# Patient Record
Sex: MALE | Race: WHITE | NOT HISPANIC OR LATINO | Employment: FULL TIME | URBAN - METROPOLITAN AREA
[De-identification: names, ages, dates, MRNs, and addresses within clinical notes are randomized per-mention and may not be internally consistent; named-entity substitution may affect disease eponyms.]

---

## 2017-03-20 ENCOUNTER — OFFICE VISIT (OUTPATIENT)
Dept: FAMILY MEDICINE CLINIC | Facility: CLINIC | Age: 37
End: 2017-03-20

## 2017-03-20 VITALS
TEMPERATURE: 98.3 F | HEART RATE: 66 BPM | OXYGEN SATURATION: 98 % | BODY MASS INDEX: 31.15 KG/M2 | SYSTOLIC BLOOD PRESSURE: 110 MMHG | HEIGHT: 72 IN | WEIGHT: 230 LBS | DIASTOLIC BLOOD PRESSURE: 60 MMHG

## 2017-03-20 DIAGNOSIS — H10.021 PINK EYE DISEASE OF RIGHT EYE: Primary | ICD-10-CM

## 2017-03-20 PROBLEM — R03.0 BLOOD PRESSURE ELEVATED WITHOUT HISTORY OF HTN: Status: ACTIVE | Noted: 2017-03-20

## 2017-03-20 PROBLEM — H10.022 PINK EYE DISEASE OF LEFT EYE: Status: ACTIVE | Noted: 2017-03-20

## 2017-03-20 PROBLEM — E78.5 HLD (HYPERLIPIDEMIA): Status: ACTIVE | Noted: 2017-03-20

## 2017-03-20 PROBLEM — R55 NEUROCARDIOGENIC SYNCOPE: Status: ACTIVE | Noted: 2017-03-20

## 2017-03-20 PROBLEM — J03.90 ACUTE INFECTIVE TONSILLITIS: Status: ACTIVE | Noted: 2017-03-20

## 2017-03-20 PROBLEM — E66.3 EXCESS WEIGHT: Status: ACTIVE | Noted: 2017-03-20

## 2017-03-20 PROCEDURE — 99213 OFFICE O/P EST LOW 20 MIN: CPT | Performed by: FAMILY MEDICINE

## 2017-03-20 RX ORDER — POLYMYXIN B SULFATE AND TRIMETHOPRIM 1; 10000 MG/ML; [USP'U]/ML
1 SOLUTION OPHTHALMIC EVERY 4 HOURS
Qty: 1 EACH | Refills: 0 | Status: SHIPPED | OUTPATIENT
Start: 2017-03-20 | End: 2019-12-02

## 2017-03-20 NOTE — PROGRESS NOTES
Subjective   Javid Cancino is a 36 y.o. male who is here for   Chief Complaint   Patient presents with   • Conjunctivitis   .     History of Present Illness     2 days of right eye redness and tearing  No direct exposure    The following portions of the patient's history were reviewed and updated as appropriate: allergies, current medications, past medical history, past social history and problem list.    Review of Systems   Eyes: Positive for photophobia, redness, itching and visual disturbance. Negative for pain and discharge.       Objective   Physical Exam   Constitutional: He appears well-developed and well-nourished. No distress.   Eyes: EOM are normal. Pupils are equal, round, and reactive to light. Right eye exhibits chemosis. Right eye exhibits no hordeolum. Right conjunctiva is injected. Left conjunctiva is not injected.   Nursing note and vitals reviewed.      Assessment/Plan   Javid was seen today for conjunctivitis.    Diagnoses and all orders for this visit:    Pink eye disease of right eye  -     trimethoprim-polymyxin b (POLYTRIM) 08528-2.1 UNIT/ML-% ophthalmic solution; Administer 1 drop to the right eye Every 4 (Four) Hours.      There are no Patient Instructions on file for this visit.    There are no discontinued medications.     Return if symptoms worsen or fail to improve.    Dr. Rafa Núñez  [place], [place]

## 2019-11-22 DIAGNOSIS — Z00.00 PHYSICAL EXAM: Primary | ICD-10-CM

## 2019-11-26 LAB
ALBUMIN SERPL-MCNC: 4.5 G/DL (ref 3.5–5.2)
ALBUMIN/GLOB SERPL: 1.8 G/DL
ALP SERPL-CCNC: 73 U/L (ref 39–117)
ALT SERPL-CCNC: 26 U/L (ref 1–41)
APPEARANCE UR: CLEAR
AST SERPL-CCNC: 24 U/L (ref 1–40)
BILIRUB SERPL-MCNC: 0.3 MG/DL (ref 0.2–1.2)
BILIRUB UR QL STRIP: NEGATIVE
BUN SERPL-MCNC: 19 MG/DL (ref 6–20)
BUN/CREAT SERPL: 17 (ref 7–25)
CALCIUM SERPL-MCNC: 9.9 MG/DL (ref 8.6–10.5)
CHLORIDE SERPL-SCNC: 103 MMOL/L (ref 98–107)
CHOLEST SERPL-MCNC: 213 MG/DL (ref 0–200)
CO2 SERPL-SCNC: 25.1 MMOL/L (ref 22–29)
COLOR UR: YELLOW
CREAT SERPL-MCNC: 1.12 MG/DL (ref 0.76–1.27)
ERYTHROCYTE [DISTWIDTH] IN BLOOD BY AUTOMATED COUNT: 12.7 % (ref 12.3–15.4)
GLOBULIN SER CALC-MCNC: 2.5 GM/DL
GLUCOSE SERPL-MCNC: 101 MG/DL (ref 65–99)
GLUCOSE UR QL: NEGATIVE
HCT VFR BLD AUTO: 43.7 % (ref 37.5–51)
HDLC SERPL-MCNC: 56 MG/DL (ref 40–60)
HGB BLD-MCNC: 15.2 G/DL (ref 13–17.7)
HGB UR QL STRIP: NEGATIVE
KETONES UR QL STRIP: NEGATIVE
LDLC SERPL CALC-MCNC: 137 MG/DL (ref 0–100)
LEUKOCYTE ESTERASE UR QL STRIP: NEGATIVE
MCH RBC QN AUTO: 29.7 PG (ref 26.6–33)
MCHC RBC AUTO-ENTMCNC: 34.8 G/DL (ref 31.5–35.7)
MCV RBC AUTO: 85.4 FL (ref 79–97)
NITRITE UR QL STRIP: NEGATIVE
PH UR STRIP: 6 [PH] (ref 5–8)
PLATELET # BLD AUTO: 227 10*3/MM3 (ref 140–450)
POTASSIUM SERPL-SCNC: 4.8 MMOL/L (ref 3.5–5.2)
PROT SERPL-MCNC: 7 G/DL (ref 6–8.5)
PROT UR QL STRIP: NEGATIVE
RBC # BLD AUTO: 5.12 10*6/MM3 (ref 4.14–5.8)
SODIUM SERPL-SCNC: 143 MMOL/L (ref 136–145)
SP GR UR: 1.01 (ref 1–1.03)
TRIGL SERPL-MCNC: 98 MG/DL (ref 0–150)
UROBILINOGEN UR STRIP-MCNC: NORMAL MG/DL
VLDLC SERPL CALC-MCNC: 19.6 MG/DL
WBC # BLD AUTO: 5.18 10*3/MM3 (ref 3.4–10.8)

## 2019-12-02 ENCOUNTER — OFFICE VISIT (OUTPATIENT)
Dept: FAMILY MEDICINE CLINIC | Facility: CLINIC | Age: 39
End: 2019-12-02

## 2019-12-02 VITALS
WEIGHT: 246 LBS | HEART RATE: 54 BPM | HEIGHT: 72 IN | OXYGEN SATURATION: 99 % | BODY MASS INDEX: 33.32 KG/M2 | DIASTOLIC BLOOD PRESSURE: 84 MMHG | SYSTOLIC BLOOD PRESSURE: 122 MMHG

## 2019-12-02 DIAGNOSIS — Z00.00 ROUTINE ADULT HEALTH MAINTENANCE: Primary | ICD-10-CM

## 2019-12-02 DIAGNOSIS — E78.2 MIXED HYPERLIPIDEMIA: ICD-10-CM

## 2019-12-02 PROBLEM — R03.0 BLOOD PRESSURE ELEVATED WITHOUT HISTORY OF HTN: Status: RESOLVED | Noted: 2017-03-20 | Resolved: 2019-12-02

## 2019-12-02 PROBLEM — H10.021 PINK EYE DISEASE OF RIGHT EYE: Status: RESOLVED | Noted: 2017-03-20 | Resolved: 2019-12-02

## 2019-12-02 PROBLEM — E66.09 CLASS 1 OBESITY DUE TO EXCESS CALORIES WITHOUT SERIOUS COMORBIDITY WITH BODY MASS INDEX (BMI) OF 33.0 TO 33.9 IN ADULT: Status: ACTIVE | Noted: 2017-03-20

## 2019-12-02 PROBLEM — J03.90 ACUTE INFECTIVE TONSILLITIS: Status: RESOLVED | Noted: 2017-03-20 | Resolved: 2019-12-02

## 2019-12-02 PROBLEM — E66.811 CLASS 1 OBESITY DUE TO EXCESS CALORIES WITHOUT SERIOUS COMORBIDITY WITH BODY MASS INDEX (BMI) OF 33.0 TO 33.9 IN ADULT: Status: ACTIVE | Noted: 2017-03-20

## 2019-12-02 PROCEDURE — 99395 PREV VISIT EST AGE 18-39: CPT | Performed by: FAMILY MEDICINE

## 2019-12-02 NOTE — PROGRESS NOTES
"   Chief Complaint   Patient presents with   • Annual Exam       Subjective   Javid Cancino is a 39 y.o. male and is here for a yearly physical exam. The patient reports no problems.    Do you take any herbs or supplements that were not prescribed by a doctor? no. If so, these will be added to active medication list.    The following portions of the patient's history were reviewed and updated as appropriate: allergies, current medications, past family history, past medical history, past social history, past surgical history and problem list.    Social and Family and Surgical History reviewed and updated today, see Rooming tab.    Health History, Preventive Measures and Vaccination flow sheets reviewed and updated today.    Patient's current medical chart in Epic; including previous office notes, imaging, labs, specialist's evaluation either in notes or in Media tab reviewed today.    Other pertinent medical information also reviewed thru Care Everywhere function is also reviewed today.    Review of Systems  Review of Systems  A comprehensive review of systems was negative.    Vitals:    12/02/19 0904   BP: 122/84   BP Location: Left arm   Patient Position: Sitting   Cuff Size: Large Adult   Pulse: 54   SpO2: 99%   Weight: 112 kg (246 lb)   Height: 182.9 cm (72\")       General Appearance:  Alert, cooperative, no distress, appears stated age   Head:  Normocephalic, without obvious abnormality, atraumatic   Eyes:  PERRL, conjunctiva/corneas clear, EOM's intact.   Ears:  Normal TM's and external ear canals, both ears   Nose: Nares normal, septum midline, mucosa normal, no drainage or sinus tenderness   Throat: Lips, mucosa, and tongue normal; teeth and gums normal   Neck: Supple, symmetrical, trachea midline, no adenopathy;   thyroid: No enlargement/tenderness/nodules; no carotid  bruit   Back:  Symmetric, no curvature, ROM normal, no CVA tenderness   Lungs:  Clear to auscultation bilaterally, respirations unlabored "   Chest wall:  No tenderness or deformity   Heart:  Regular rate and rhythm, S1 and S2 normal, no murmur, rub or gallop   Abdomen:  Soft, non-tender, bowel sounds active all four quadrants,   no masses, no organomegaly   Rectal:        Extremities: Extremities normal, atraumatic, no cyanosis or edema   Pulses: 2+ and symmetric all extremities   Skin: Skin color, texture, turgor normal, no rashes or lesions   Lymph nodes: Cervical, supraclavicular, and axillary nodes normal   Neurologic: CNII-XII intact. Normal strength, sensation and reflexes   throughout          Results for orders placed or performed in visit on 11/22/19   Lipid Panel   Result Value Ref Range    Total Cholesterol 213 (H) 0 - 200 mg/dL    Triglycerides 98 0 - 150 mg/dL    HDL Cholesterol 56 40 - 60 mg/dL    VLDL Cholesterol 19.6 mg/dL    LDL Cholesterol  137 (H) 0 - 100 mg/dL   Comprehensive Metabolic Panel   Result Value Ref Range    Glucose 101 (H) 65 - 99 mg/dL    BUN 19 6 - 20 mg/dL    Creatinine 1.12 0.76 - 1.27 mg/dL    eGFR Non African Am 74 >60 mL/min/1.73    eGFR African Am 89 >60 mL/min/1.73    BUN/Creatinine Ratio 17.0 7.0 - 25.0    Sodium 143 136 - 145 mmol/L    Potassium 4.8 3.5 - 5.2 mmol/L    Chloride 103 98 - 107 mmol/L    Total CO2 25.1 22.0 - 29.0 mmol/L    Calcium 9.9 8.6 - 10.5 mg/dL    Total Protein 7.0 6.0 - 8.5 g/dL    Albumin 4.50 3.50 - 5.20 g/dL    Globulin 2.5 gm/dL    A/G Ratio 1.8 g/dL    Total Bilirubin 0.3 0.2 - 1.2 mg/dL    Alkaline Phosphatase 73 39 - 117 U/L    AST (SGOT) 24 1 - 40 U/L    ALT (SGPT) 26 1 - 41 U/L   CBC (No Diff)   Result Value Ref Range    WBC 5.18 3.40 - 10.80 10*3/mm3    RBC 5.12 4.14 - 5.80 10*6/mm3    Hemoglobin 15.2 13.0 - 17.7 g/dL    Hematocrit 43.7 37.5 - 51.0 %    MCV 85.4 79.0 - 97.0 fL    MCH 29.7 26.6 - 33.0 pg    MCHC 34.8 31.5 - 35.7 g/dL    RDW 12.7 12.3 - 15.4 %    Platelets 227 140 - 450 10*3/mm3   Urinalysis With Microscopic If Indicated (No Culture) - Urine, Clean Catch   Result  Value Ref Range    Specific Gravity, UA 1.013 1.005 - 1.030    pH, UA 6.0 5.0 - 8.0    Color, UA Yellow     Appearance, UA Clear Clear    Leukocytes, UA Negative Negative    Protein Negative Negative    Glucose, UA Negative Negative    Ketones Negative Negative    Blood, UA Negative Negative    Bilirubin, UA Negative Negative    Urobilinogen, UA Comment     Nitrite, UA Negative Negative     Assessment/Plan   Healthy male exam.  Javid was seen today for annual exam.    Diagnoses and all orders for this visit:    Routine adult health maintenance  -     Lipid Panel; Future  -     CBC (No Diff); Future  -     Comprehensive Metabolic Panel; Future  -     Urinalysis With Microscopic If Indicated (No Culture) - Urine, Clean Catch; Future    Mixed hyperlipidemia  -     Lipid Panel; Future      1. Doing well  Chol up some  2. Patient Counseling:  --Nutrition: Stressed importance of moderation in sodium/caffeine intake, saturated fat and cholesterol.  Discussed caloric balance, sufficient intake of fresh fruits, vegetables, fiber, calcium, iron.  --  --Exercise: Stressed the importance of regular exercise. Keep playing basketball and lifting weights    --Substance Abuse: Discussed cessation/primary prevention of tobacco, alcohol, or other drug use; driving or other dangerous activities under the influence.    --Dental health: Discussed importance of regular tooth brushing, flossing, and dental visits.  -- suggested having eyes and vision checked if needed or past due.  --Immunizations reviewed.  -  3. Discussed the patient's BMI with him.  The BMI is above average; BMI management plan is completed  4. Follow up in one year    There are no Patient Instructions on file for this visit.    Medications Discontinued During This Encounter   Medication Reason   • trimethoprim-polymyxin b (POLYTRIM) 36722-8.1 UNIT/ML-% ophthalmic solution *Therapy completed        Dr. Rafa Núñez MD  [place], [place]  [facility]

## 2020-12-02 ENCOUNTER — RESULTS ENCOUNTER (OUTPATIENT)
Dept: FAMILY MEDICINE CLINIC | Facility: CLINIC | Age: 40
End: 2020-12-02

## 2020-12-02 DIAGNOSIS — Z00.00 ROUTINE ADULT HEALTH MAINTENANCE: ICD-10-CM

## 2020-12-02 DIAGNOSIS — E78.2 MIXED HYPERLIPIDEMIA: ICD-10-CM

## 2022-06-29 ENCOUNTER — TELEPHONE (OUTPATIENT)
Dept: FAMILY MEDICINE CLINIC | Facility: CLINIC | Age: 42
End: 2022-06-29

## 2022-06-29 NOTE — TELEPHONE ENCOUNTER
Caller: Javid Cancino    Relationship: Self    Best call back number: [phone]    What is the medical concern/diagnosis: EYE MISALIGNMENT    What specialty or service is being requested: OPHTHALMOLOGIST OR OPTOMETRIST     What is the provider, practice or medical service name: WHOEVER HIS PCP RECOMMENDS  nical staff to follow up on this request.”

## 2022-06-29 NOTE — TELEPHONE ENCOUNTER
Dr. David Jackson, optometrist.  [facility] eye [facility].  office is within the [facility]

## 2022-09-12 DIAGNOSIS — Z00.00 ROUTINE ADULT HEALTH MAINTENANCE: Primary | ICD-10-CM

## 2022-09-12 DIAGNOSIS — Z00.00 ROUTINE ADULT HEALTH MAINTENANCE: ICD-10-CM

## 2022-09-12 DIAGNOSIS — E78.2 MIXED HYPERLIPIDEMIA: ICD-10-CM

## 2022-09-14 LAB
ALBUMIN SERPL-MCNC: 4.9 G/DL (ref 3.5–5.2)
ALBUMIN/GLOB SERPL: 2.3 G/DL
ALP SERPL-CCNC: 83 U/L (ref 39–117)
ALT SERPL-CCNC: 26 U/L (ref 1–41)
APPEARANCE UR: CLEAR
AST SERPL-CCNC: 25 U/L (ref 1–40)
BILIRUB SERPL-MCNC: 0.3 MG/DL (ref 0–1.2)
BILIRUB UR QL STRIP: NEGATIVE
BUN SERPL-MCNC: 18 MG/DL (ref 6–20)
BUN/CREAT SERPL: 14.2 (ref 7–25)
CALCIUM SERPL-MCNC: 9.8 MG/DL (ref 8.6–10.5)
CHLORIDE SERPL-SCNC: 103 MMOL/L (ref 98–107)
CHOLEST SERPL-MCNC: 234 MG/DL (ref 0–200)
CO2 SERPL-SCNC: 27.8 MMOL/L (ref 22–29)
COLOR UR: YELLOW
CREAT SERPL-MCNC: 1.27 MG/DL (ref 0.76–1.27)
EGFRCR-CYS SERPLBLD CKD-EPI 2021: 73.2 ML/MIN/1.73
ERYTHROCYTE [DISTWIDTH] IN BLOOD BY AUTOMATED COUNT: 12.5 % (ref 12.3–15.4)
GLOBULIN SER CALC-MCNC: 2.1 GM/DL
GLUCOSE SERPL-MCNC: 101 MG/DL (ref 65–99)
GLUCOSE UR QL STRIP: NEGATIVE
HCT VFR BLD AUTO: 46.5 % (ref 37.5–51)
HDLC SERPL-MCNC: 51 MG/DL (ref 40–60)
HGB BLD-MCNC: 15.2 G/DL (ref 13–17.7)
HGB UR QL STRIP: NEGATIVE
KETONES UR QL STRIP: NEGATIVE
LDLC SERPL CALC-MCNC: 149 MG/DL (ref 0–100)
LEUKOCYTE ESTERASE UR QL STRIP: NEGATIVE
MCH RBC QN AUTO: 29.6 PG (ref 26.6–33)
MCHC RBC AUTO-ENTMCNC: 32.7 G/DL (ref 31.5–35.7)
MCV RBC AUTO: 90.5 FL (ref 79–97)
NITRITE UR QL STRIP: NEGATIVE
PH UR STRIP: 6 [PH] (ref 5–8)
PLATELET # BLD AUTO: 271 10*3/MM3 (ref 140–450)
POTASSIUM SERPL-SCNC: 4.6 MMOL/L (ref 3.5–5.2)
PROT SERPL-MCNC: 7 G/DL (ref 6–8.5)
PROT UR QL STRIP: NEGATIVE
RBC # BLD AUTO: 5.14 10*6/MM3 (ref 4.14–5.8)
SODIUM SERPL-SCNC: 141 MMOL/L (ref 136–145)
SP GR UR STRIP: 1.01 (ref 1–1.03)
TRIGL SERPL-MCNC: 190 MG/DL (ref 0–150)
UROBILINOGEN UR STRIP-MCNC: NORMAL MG/DL
VLDLC SERPL CALC-MCNC: 34 MG/DL (ref 5–40)
WBC # BLD AUTO: 5.66 10*3/MM3 (ref 3.4–10.8)

## 2022-09-30 ENCOUNTER — OFFICE VISIT (OUTPATIENT)
Dept: FAMILY MEDICINE CLINIC | Facility: CLINIC | Age: 42
End: 2022-09-30

## 2022-09-30 VITALS
OXYGEN SATURATION: 95 % | WEIGHT: 248 LBS | TEMPERATURE: 97.8 F | HEART RATE: 66 BPM | HEIGHT: 72 IN | DIASTOLIC BLOOD PRESSURE: 90 MMHG | SYSTOLIC BLOOD PRESSURE: 138 MMHG | BODY MASS INDEX: 33.59 KG/M2

## 2022-09-30 DIAGNOSIS — R03.0 ELEVATED BLOOD PRESSURE READING: Primary | ICD-10-CM

## 2022-09-30 PROCEDURE — 99212 OFFICE O/P EST SF 10 MIN: CPT | Performed by: FAMILY MEDICINE

## 2022-11-11 ENCOUNTER — OFFICE VISIT (OUTPATIENT)
Dept: FAMILY MEDICINE CLINIC | Facility: CLINIC | Age: 42
End: 2022-11-11

## 2022-11-11 VITALS
BODY MASS INDEX: 34.27 KG/M2 | HEART RATE: 56 BPM | DIASTOLIC BLOOD PRESSURE: 82 MMHG | WEIGHT: 253 LBS | OXYGEN SATURATION: 98 % | HEIGHT: 72 IN | SYSTOLIC BLOOD PRESSURE: 130 MMHG | TEMPERATURE: 97.3 F

## 2022-11-11 DIAGNOSIS — E78.2 MODERATE MIXED HYPERLIPIDEMIA NOT REQUIRING STATIN THERAPY: ICD-10-CM

## 2022-11-11 DIAGNOSIS — R03.0 ELEVATED BLOOD PRESSURE READING: Primary | ICD-10-CM

## 2022-11-11 PROCEDURE — 99213 OFFICE O/P EST LOW 20 MIN: CPT | Performed by: FAMILY MEDICINE

## 2022-11-11 NOTE — PROGRESS NOTES
"  Subjective   Javid Cancino is a 42 y.o. male who is here for   Chief Complaint   Patient presents with   • Hypertension     6 week f/u - checking bp at home, 133/82- 136/80 averaging    .   Answers for HPI/ROS submitted by the patient on 11/9/2022  What is the primary reason for your visit?: High Blood Pressure      History of Present Illness   Following up with his elevated blood pressure readings at home.  His numbers have improved at home.  He is checking his blood pressure 1-2 times a day averaging 130 systolic.  He is having no cardiovascular symptoms.      The following portions of the patient's history were reviewed and updated as appropriate: allergies, current medications, past family history, past medical history, past social history, past surgical history and problem list.    Review of Systems    Objective   Vitals:    11/11/22 1328   BP: 130/82   BP Location: Left arm   Patient Position: Sitting   Cuff Size: Large Adult   Pulse: 56   Temp: 97.3 °F (36.3 °C)   SpO2: 98%   Weight: 115 kg (253 lb)   Height: 182.9 cm (72\")      Physical Exam  Vitals reviewed.   Cardiovascular:      Rate and Rhythm: Normal rate.   Pulmonary:      Effort: Pulmonary effort is normal.   Neurological:      Mental Status: He is alert.           Home cuff on right:  147/81 HR 59  MD manual : 150/90    Assessment & Plan   Diagnoses and all orders for this visit:    1. Elevated blood pressure reading (Primary)  -     Basic Metabolic Panel; Future  -     Lipid Panel; Future    2. Moderate mixed hyperlipidemia not requiring statin therapy  -     Lipid Panel; Future    Blood pressure is elevated here in the office.  We cross checked his home cuff with an manual and they are reading similar    No need for medication at this time.    Continue healthy lifestyle changes discussed.  We will see him back in 6 months  There are no Patient Instructions on file for this visit.    There are no discontinued medications.     Return in about 6 " months (around 5/11/2023) for blood pressure, Annual physical.    Dr. Rafa Núñez  [place], [place]

## 2023-05-18 ENCOUNTER — OFFICE VISIT (OUTPATIENT)
Dept: FAMILY MEDICINE CLINIC | Facility: CLINIC | Age: 43
End: 2023-05-18
Payer: COMMERCIAL

## 2023-05-18 VITALS
BODY MASS INDEX: 33.18 KG/M2 | HEART RATE: 46 BPM | TEMPERATURE: 97.5 F | HEIGHT: 72 IN | SYSTOLIC BLOOD PRESSURE: 120 MMHG | WEIGHT: 245 LBS | OXYGEN SATURATION: 97 % | DIASTOLIC BLOOD PRESSURE: 74 MMHG

## 2023-05-18 DIAGNOSIS — Z00.00 ROUTINE ADULT HEALTH MAINTENANCE: Primary | ICD-10-CM

## 2023-05-18 DIAGNOSIS — E78.2 MODERATE MIXED HYPERLIPIDEMIA NOT REQUIRING STATIN THERAPY: ICD-10-CM

## 2023-05-18 PROBLEM — R55 NEUROCARDIOGENIC SYNCOPE: Status: RESOLVED | Noted: 2017-03-20 | Resolved: 2023-05-18

## 2023-05-18 PROCEDURE — 99396 PREV VISIT EST AGE 40-64: CPT | Performed by: FAMILY MEDICINE

## 2023-05-18 NOTE — PROGRESS NOTES
"  Chief Complaint   Patient presents with   • Annual Exam       Subjective   Javid Cancino is a 43 y.o. male and is here for a yearly physical exam. The patient reports no problems.    Do you take any herbs or supplements that were not prescribed by a doctor? no. If so, these will be added to active medication list.    The following portions of the patient's history were reviewed and updated as appropriate: allergies, current medications, past family history, past medical history, past social history, past surgical history and problem list.    Social and Family and Surgical History reviewed and updated today, see Rooming tab.    Health History, Preventive Measures and Vaccination flow sheets reviewed and updated today.    Patient's current medical chart in Epic; including previous office notes, Mychart messages, recent phone calls, imaging, labs, specialist's evaluation either in notes or in Media tab reviewed today.    Other outside pertinent medical information also reviewed thru Care Everywhere function is also reviewed today.    Review of Systems  Review of Systems  A comprehensive review of systems was negative.    Vitals:    05/18/23 0956   BP: 120/74   BP Location: Left arm   Patient Position: Sitting   Cuff Size: Large Adult   Pulse: (!) 46   Temp: 97.5 °F (36.4 °C)   SpO2: 97%   Weight: 111 kg (245 lb)   Height: 182.9 cm (72\")     Body mass index is 33.23 kg/m².      General Appearance:  Alert, cooperative, no distress, appears stated age   Head:  Normocephalic, without obvious abnormality, atraumatic   Eyes:  PERRL, conjunctiva/corneas clear, EOM's intact.   Ears:  Normal TM's and external ear canals, both ears   Nose: Nares normal, septum midline, mucosa normal, no drainage or sinus tenderness   Throat: Lips, mucosa, and tongue normal; teeth and gums normal   Neck: Supple, symmetrical, trachea midline, no adenopathy;   thyroid: No enlargement/tenderness/nodules; no carotid  bruit   Back:  Symmetric, " no curvature, ROM normal, no CVA tenderness   Lungs:  Clear to auscultation bilaterally, respirations unlabored   Chest wall:  No tenderness or deformity   Heart:  Regular rate and rhythm, S1 and S2 normal, no murmur, rub or gallop   Abdomen:  Soft, non-tender, bowel sounds active all four quadrants,   no masses, no organomegaly   Rectal:        Extremities: Extremities normal, atraumatic, no cyanosis or edema   Pulses: 2+ and symmetric all extremities   Skin: Skin color, texture, turgor normal, no rashes or lesions   Lymph nodes: Cervical, supraclavicular, and axillary nodes normal   Neurologic: CNII-XII intact. Normal strength, sensation.          Results for orders placed or performed in visit on 05/11/23   Basic Metabolic Panel    Specimen: Blood   Result Value Ref Range    Glucose 104 (H) 65 - 99 mg/dL    BUN 23 (H) 6 - 20 mg/dL    Creatinine 1.27 0.76 - 1.27 mg/dL    EGFR Result 72.8 >60.0 mL/min/1.73    BUN/Creatinine Ratio 18.1 7.0 - 25.0    Sodium 142 136 - 145 mmol/L    Potassium 5.2 3.5 - 5.2 mmol/L    Chloride 104 98 - 107 mmol/L    Total CO2 30.2 (H) 22.0 - 29.0 mmol/L    Calcium 10.4 8.6 - 10.5 mg/dL   Lipid Panel    Specimen: Blood   Result Value Ref Range    Total Cholesterol 271 (H) 0 - 200 mg/dL    Triglycerides 159 (H) 0 - 150 mg/dL    HDL Cholesterol 58 40 - 60 mg/dL    VLDL Cholesterol Alan 29 5 - 40 mg/dL    LDL Chol Calc (NIH) 184 (H) 0 - 100 mg/dL     Assessment & Plan   Healthy male exam.  Diagnoses and all orders for this visit:    1. Routine adult health maintenance (Primary)  -     CBC (No Diff); Future  -     Comprehensive Metabolic Panel; Future  -     Urinalysis With Microscopic If Indicated (No Culture) - Urine, Clean Catch; Future    2. Moderate mixed hyperlipidemia not requiring statin therapy  -     Lipid Panel; Future      1. Doing well  Chol is up  2. Patient Counseling:  --Nutrition: Stressed importance of moderation in: sodium, caffeine, , saturated fat and cholesterol.   Discussed: caloric balance, sufficient intake of fresh fruits, vegetables, fiber, calcium, iron.  --Exercise: Stressed the importance of regular exercise.   --Substance Abuse: Discussed cessation and or reduction of tobacco, alcohol, or other drug use; driving or other dangerous activities under the influence.    --Dental health: Discussed importance of regular tooth brushing, flossing, and dental visits.  --Suggested having eyes and vision checked if needed or past due.  --Immunizations reviewed and given if needed.  --  3. Discussed the patient's BMI with him.  The BMI is in the acceptable range  4. Follow up in one year    There are no Patient Instructions on file for this visit.    There are no discontinued medications.     Dr. Rafa Núñez MD  [place], [place]  [facility]

## 2023-10-04 ENCOUNTER — OFFICE VISIT (OUTPATIENT)
Dept: FAMILY MEDICINE CLINIC | Facility: CLINIC | Age: 43
End: 2023-10-04
Payer: COMMERCIAL

## 2023-10-04 VITALS
HEART RATE: 66 BPM | SYSTOLIC BLOOD PRESSURE: 120 MMHG | BODY MASS INDEX: 33.72 KG/M2 | OXYGEN SATURATION: 96 % | DIASTOLIC BLOOD PRESSURE: 78 MMHG | HEIGHT: 72 IN | TEMPERATURE: 98 F | WEIGHT: 249 LBS

## 2023-10-04 DIAGNOSIS — M54.2 ACUTE NECK PAIN: Primary | ICD-10-CM

## 2023-10-04 PROCEDURE — 99213 OFFICE O/P EST LOW 20 MIN: CPT | Performed by: FAMILY MEDICINE

## 2023-10-04 NOTE — PROGRESS NOTES
"  Subjective   Javid Cancino is a 43 y.o. male who is here for   Chief Complaint   Patient presents with    Sore Throat     Around neck/Zabala apple times two weeks   . Answers submitted by the patient for this visit:  Primary Reason for Visit (Submitted on 10/3/2023)  What is the primary reason for your visit?: Other  Other (Submitted on 10/3/2023)  Please describe your symptoms.: Sworeness in neck near [place]on’s Apple.  Have you had these symptoms before?: No  How long have you been having these symptoms?: 1-2 weeks  Please list any medications you are currently taking for this condition.: None.  Please describe any probable cause for these symptoms. : Unknown.      History of Present Illness   Sore anterior neck for 1 to 2 weeks.  Initially started under the jawline on both sides.  Now it is settled to the anterior trachea and the front.  No swelling area no injury no rash  Denies sore throat      The following portions of the patient's history were reviewed and updated as appropriate: allergies, current medications, past family history, past medical history, past social history, past surgical history, and problem list.    Review of Systems    Objective   Vitals:    10/04/23 1414   BP: 120/78   Pulse: 66   Temp: 98 °F (36.7 °C)   SpO2: 96%   Weight: 113 kg (249 lb)   Height: 182.9 cm (72.01\")      Physical Exam  Vitals reviewed.   HENT:      Mouth/Throat:      Pharynx: No oropharyngeal exudate or posterior oropharyngeal erythema.   Neck:      Vascular: No carotid bruit.   Musculoskeletal:      Cervical back: Normal range of motion and neck supple. No rigidity or tenderness.   Lymphadenopathy:      Cervical: No cervical adenopathy.   Neurological:      Mental Status: He is alert.     Throat and neck exam normal  Assessment & Plan   Diagnoses and all orders for this visit:    1. Acute neck pain (Primary)  -     US Head Neck Soft Tissue; Future    Exam is normal  We will check ultrasound soft tissue's of " neck    There are no Patient Instructions on file for this visit.    There are no discontinued medications.     No follow-ups on file.    Dr. Rafa Núñez  [place], [place]

## 2023-10-09 ENCOUNTER — TELEPHONE (OUTPATIENT)
Dept: FAMILY MEDICINE CLINIC | Facility: CLINIC | Age: 43
End: 2023-10-09

## 2023-10-09 NOTE — TELEPHONE ENCOUNTER
Caller: Javid Cancino    Relationship: Self    Best call back number:     What is the best time to reach you:     Who are you requesting to speak with (clinical staff, provider,  specific staff member):     Do you know the name of the person who called:     What was the call regarding: PATIENT WANTS TO BE CALLED AS HE HAS NOT HEARD ANYTHING ABOUT THE ULTRASOUND ON HIS NECK.     Is it okay if the provider responds through MyChart:

## 2023-10-18 ENCOUNTER — HOSPITAL ENCOUNTER (OUTPATIENT)
Dept: ULTRASOUND IMAGING | Facility: HOSPITAL | Age: 43
Discharge: HOME | End: 2023-10-18
Admitting: FAMILY MEDICINE
Payer: COMMERCIAL

## 2023-10-18 DIAGNOSIS — M54.2 ACUTE NECK PAIN: ICD-10-CM

## 2023-10-18 PROCEDURE — 76536 US EXAM OF HEAD AND NECK: CPT

## 2023-10-20 DIAGNOSIS — R59.1 LYMPHADENOPATHY: ICD-10-CM

## 2023-10-20 DIAGNOSIS — R93.89 ABNORMAL THYROID ULTRASOUND: Primary | ICD-10-CM

## 2023-11-01 ENCOUNTER — HOSPITAL ENCOUNTER (OUTPATIENT)
Dept: CT IMAGING | Facility: HOSPITAL | Age: 43
Discharge: HOME | End: 2023-11-01
Admitting: PHYSICIAN ASSISTANT
Payer: COMMERCIAL

## 2023-11-01 DIAGNOSIS — R93.89 ABNORMAL THYROID ULTRASOUND: ICD-10-CM

## 2023-11-01 DIAGNOSIS — R59.1 LYMPHADENOPATHY: ICD-10-CM

## 2023-11-01 PROCEDURE — 25510000001 IOPAMIDOL PER 1 ML: Performed by: PHYSICIAN ASSISTANT

## 2023-11-01 PROCEDURE — 70491 CT SOFT TISSUE NECK W/DYE: CPT

## 2023-11-01 RX ADMIN — IOPAMIDOL 100 ML: 755 INJECTION, SOLUTION INTRAVENOUS at 16:59

## 2023-11-03 ENCOUNTER — TELEPHONE (OUTPATIENT)
Dept: FAMILY MEDICINE CLINIC | Facility: CLINIC | Age: 43
End: 2023-11-03

## 2023-11-03 NOTE — TELEPHONE ENCOUNTER
I spoke with pt explained results from the CT scan per Shelia's note, he would prefer to speak with you over the phone if at all possible. I offered a video visit/ or in office visit but he said he just needs a phone call.

## 2023-11-03 NOTE — TELEPHONE ENCOUNTER
Caller: Javid Cancino    Relationship: Self    Best call back number: [phone]     Caller requesting test results: FOR HIS RECENT CT SCAN AND ULTRA SOUND    What test was performed: CT SCAN AND A ULTRA SOUND      Additional notes:       IT SHOWS ON PATIENT'S MYCHART THAT THE TEST HAD BEEN REVIEWED AND HE WOULD LIKE TO DISCUSS THE TEST RESULT WITH YOU PLEASE.      PLEASE CALL AND ADVISE

## 2024-04-15 ENCOUNTER — TELEPHONE (OUTPATIENT)
Dept: FAMILY MEDICINE CLINIC | Facility: CLINIC | Age: 44
End: 2024-04-15
Payer: COMMERCIAL

## 2024-04-15 NOTE — TELEPHONE ENCOUNTER
HUB TO RELAY:   Tried to call patient about his upcoming appointment with Dr. Núñez on 05/20/24.   The provider will be out of the office and we are needing to reschedule. No answer, LVM.

## 2024-05-29 ENCOUNTER — OFFICE VISIT (OUTPATIENT)
Dept: FAMILY MEDICINE CLINIC | Facility: CLINIC | Age: 44
End: 2024-05-29
Payer: COMMERCIAL

## 2024-05-29 VITALS
HEART RATE: 56 BPM | HEIGHT: 72 IN | OXYGEN SATURATION: 98 % | SYSTOLIC BLOOD PRESSURE: 126 MMHG | TEMPERATURE: 97.1 F | BODY MASS INDEX: 34 KG/M2 | DIASTOLIC BLOOD PRESSURE: 78 MMHG | WEIGHT: 251 LBS

## 2024-05-29 DIAGNOSIS — E78.2 MODERATE MIXED HYPERLIPIDEMIA NOT REQUIRING STATIN THERAPY: ICD-10-CM

## 2024-05-29 DIAGNOSIS — Z00.00 ROUTINE ADULT HEALTH MAINTENANCE: Primary | ICD-10-CM

## 2024-05-29 PROCEDURE — 99396 PREV VISIT EST AGE 40-64: CPT | Performed by: FAMILY MEDICINE

## 2024-05-29 NOTE — PROGRESS NOTES
"  Chief Complaint   Patient presents with    Annual Exam     Follow up on labs as well       Subjective   Javid Cancino is a 44 y.o. male and is here for a yearly physical exam. The patient reports no problems.    Do you take any herbs or supplements that were not prescribed by a doctor? no. If so, these will be added to active medication list.    The following portions of the patient's history were reviewed and updated as appropriate: allergies, current medications, past family history, past medical history, past social history, past surgical history, and problem list.    Social and Family and Surgical History reviewed and updated today, see Rooming tab.    Health History, Preventive Measures and Vaccination flow sheets reviewed and updated today.    Patient's current medical chart in Epic; including previous office notes, Mychart messages, recent phone calls, imaging, labs, specialist's evaluation either in notes or in Media tab reviewed today.    Other outside pertinent medical information also reviewed thru Care Everywhere function is also reviewed today.    Review of Systems  Review of Systems  A comprehensive review of systems was negative.    Vitals:    05/29/24 0821   BP: 126/78   BP Location: Left arm   Patient Position: Sitting   Cuff Size: Large Adult   Pulse: 56   Temp: 97.1 °F (36.2 °C)   SpO2: 98%   Weight: 114 kg (251 lb)   Height: 182.9 cm (72.01\")     Body mass index is 34.03 kg/m².  BMI is >= 30 and <35. (Class 1 Obesity). The following options were offered after discussion;: exercise counseling/recommendations and nutrition counseling/recommendations          General Appearance:  Alert, cooperative, no distress, appears stated age   Head:  Normocephalic, without obvious abnormality, atraumatic   Eyes:  PERRL, conjunctiva/corneas clear, EOM's intact.   Ears:  Normal TM's and external ear canals, both ears   Nose: Nares normal, septum midline, mucosa normal, no drainage or sinus tenderness "   Throat: Lips, mucosa, and tongue normal; teeth and gums normal   Neck: Supple, symmetrical, trachea midline, no adenopathy;   thyroid: No enlargement/tenderness/nodules; no carotid  bruit   Back:  Symmetric, no curvature, ROM normal, no CVA tenderness   Lungs:  Clear to auscultation bilaterally, respirations unlabored   Chest wall:  No tenderness or deformity   Heart:  Regular rate and rhythm, S1 and S2 normal, no murmur, rub or gallop   Abdomen:  Soft, non-tender, bowel sounds active all four quadrants,   no masses, no organomegaly   Rectal:        Extremities: Extremities normal, atraumatic, no cyanosis or edema   Pulses: 2+ and symmetric all extremities   Skin: Skin color, texture, turgor normal, no rashes or lesions   Lymph nodes: Cervical, supraclavicular, and axillary nodes normal   Neurologic: CNII-XII intact. Normal strength, sensation.          Results for orders placed or performed in visit on 05/01/24   CBC (No Diff)    Specimen: Blood   Result Value Ref Range    WBC 6.35 3.40 - 10.80 10*3/mm3    RBC 5.25 4.14 - 5.80 10*6/mm3    Hemoglobin 15.5 13.0 - 17.7 g/dL    Hematocrit 46.2 37.5 - 51.0 %    MCV 88.0 79.0 - 97.0 fL    MCH 29.5 26.6 - 33.0 pg    MCHC 33.5 31.5 - 35.7 g/dL    RDW 12.9 12.3 - 15.4 %    Platelets 279 140 - 450 10*3/mm3   Comprehensive Metabolic Panel    Specimen: Blood   Result Value Ref Range    Glucose 94 65 - 99 mg/dL    BUN 21 (H) 6 - 20 mg/dL    Creatinine 1.19 0.76 - 1.27 mg/dL    EGFR Result 78.2 >60.0 mL/min/1.73    BUN/Creatinine Ratio 17.6 7.0 - 25.0    Sodium 140 136 - 145 mmol/L    Potassium 4.7 3.5 - 5.2 mmol/L    Chloride 103 98 - 107 mmol/L    Total CO2 23.5 22.0 - 29.0 mmol/L    Calcium 9.5 8.6 - 10.5 mg/dL    Total Protein 7.1 6.0 - 8.5 g/dL    Albumin 4.5 3.5 - 5.2 g/dL    Globulin 2.6 gm/dL    A/G Ratio 1.7 g/dL    Total Bilirubin <0.2 0.0 - 1.2 mg/dL    Alkaline Phosphatase 81 39 - 117 U/L    AST (SGOT) 28 1 - 40 U/L    ALT (SGPT) 26 1 - 41 U/L   Lipid Panel     Specimen: Blood   Result Value Ref Range    Total Cholesterol 270 (H) 0 - 200 mg/dL    Triglycerides 424 (H) 0 - 150 mg/dL    HDL Cholesterol 45 40 - 60 mg/dL    VLDL Cholesterol Alan 79 (H) 5 - 40 mg/dL    LDL Chol Calc (NIH) 146 (H) 0 - 100 mg/dL   Urinalysis With Microscopic If Indicated (No Culture) - Urine, Clean Catch    Specimen: Urine, Clean Catch   Result Value Ref Range    Specific Gravity, UA 1.020 1.005 - 1.030    pH, UA 5.5 5.0 - 8.0    Color, UA Yellow     Appearance, UA Clear Clear    Leukocytes, UA Negative Negative    Protein Negative Negative    Glucose, UA Negative Negative    Ketones Negative Negative    Blood, UA Negative Negative    Bilirubin, UA Negative Negative    Urobilinogen, UA Comment     Nitrite, UA Negative Negative     Assessment & Plan   Healthy male exam.  Diagnoses and all orders for this visit:    1. Routine adult health maintenance (Primary)  -     CBC (No Diff); Future  -     Comprehensive Metabolic Panel; Future  -     Urinalysis With Microscopic If Indicated (No Culture) - Urine, Clean Catch; Future    2. Moderate mixed hyperlipidemia not requiring statin therapy  -     Lipid Panel; Future      1. Chol is up  2. Patient Counseling:  --Nutrition: Stressed importance of moderation in: sodium, caffeine, , saturated fat and cholesterol.  Discussed: caloric balance, sufficient intake of fresh fruits, vegetables, fiber, calcium, iron.  --Exercise: Stressed the importance of regular exercise.   --Substance Abuse: Discussed cessation and or reduction of tobacco, alcohol, or other drug use; driving or other dangerous activities under the influence.    --Dental health: Discussed importance of regular tooth brushing, flossing, and dental visits.  --Suggested having eyes and vision checked if needed or past due.  --Immunizations reviewed and given if needed.  --  3. Discussed the patient's BMI with him.  The BMI is above average; BMI management plan is completed  4. Follow up next physical  in 1 year    There are no Patient Instructions on file for this visit.    There are no discontinued medications.     Dr. Rafa Núñez MD  [place]  [facility]

## 2024-08-22 ENCOUNTER — TELEPHONE (OUTPATIENT)
Dept: FAMILY MEDICINE CLINIC | Facility: CLINIC | Age: 44
End: 2024-08-22

## 2024-10-07 ENCOUNTER — OFFICE VISIT (OUTPATIENT)
Dept: FAMILY MEDICINE CLINIC | Facility: CLINIC | Age: 44
End: 2024-10-07
Payer: COMMERCIAL

## 2024-10-07 VITALS
TEMPERATURE: 97.7 F | WEIGHT: 257 LBS | DIASTOLIC BLOOD PRESSURE: 80 MMHG | HEART RATE: 86 BPM | SYSTOLIC BLOOD PRESSURE: 120 MMHG | BODY MASS INDEX: 34.81 KG/M2 | OXYGEN SATURATION: 98 % | HEIGHT: 72 IN

## 2024-10-07 DIAGNOSIS — N28.89 LEFT RENAL MASS: Primary | ICD-10-CM

## 2024-10-07 PROBLEM — Z90.49 S/P LAPAROSCOPIC APPENDECTOMY: Status: ACTIVE | Noted: 2024-08-21

## 2024-10-07 PROCEDURE — 99213 OFFICE O/P EST LOW 20 MIN: CPT | Performed by: FAMILY MEDICINE

## 2024-10-07 NOTE — PROGRESS NOTES
"  Subjective   Javid Cancino is a 44 y.o. male who is here for   Chief Complaint   Patient presents with    Discuss results of CT Abdomen & Pelvis   .   Answers submitted by the patient for this visit:  Other (Submitted on 10/7/2024)  Please describe your symptoms.: Review CT Scan.  Have you had these symptoms before?: No  How long have you been having these symptoms?: Greater than 2 weeks  Primary Reason for Visit (Submitted on 10/7/2024)  What is the primary reason for your visit?: Problem Not Listed    History of Present Illness     Patient had successful laparoscopic appendectomy 2 months ago.  Has healed up well from that  On the CT scan there was a lesion on his left kidney.  Father had renal cancer  No hematuria no left flank pain  Incidental finding    The following portions of the patient's history were reviewed and updated as appropriate: allergies, current medications, past family history, past medical history, past social history, past surgical history, and problem list.    Review of Systems    Objective   Vitals:    10/07/24 1501   BP: 120/80   BP Location: Left arm   Patient Position: Sitting   Cuff Size: Adult   Pulse: 86   Temp: 97.7 °F (36.5 °C)   SpO2: 98%   Weight: 117 kg (257 lb)   Height: 182.9 cm (72\")      Physical Exam  Vitals reviewed.   Abdominal:      Tenderness: There is no abdominal tenderness. There is no right CVA tenderness or left CVA tenderness.   Neurological:      Mental Status: He is alert.       CT Abdomen Pelvis With Contrast (08/20/2024 20:21)   Assessment & Plan   Diagnoses and all orders for this visit:    1. Left renal mass (Primary)  -     MRI abdomen w wo contrast; Future      There are no Patient Instructions on file for this visit.    There are no discontinued medications.     No follow-ups on file.    Dr. Rafa Núñez  [facility]  [place], [place]    "

## 2025-02-12 ENCOUNTER — OFFICE VISIT (OUTPATIENT)
Dept: FAMILY MEDICINE CLINIC | Facility: CLINIC | Age: 45
End: 2025-02-12
Payer: COMMERCIAL

## 2025-02-12 VITALS
TEMPERATURE: 98 F | BODY MASS INDEX: 35.08 KG/M2 | DIASTOLIC BLOOD PRESSURE: 86 MMHG | WEIGHT: 259 LBS | HEART RATE: 76 BPM | HEIGHT: 72 IN | SYSTOLIC BLOOD PRESSURE: 130 MMHG | OXYGEN SATURATION: 96 %

## 2025-02-12 DIAGNOSIS — J06.9 ACUTE URI: Primary | ICD-10-CM

## 2025-02-12 PROBLEM — Z90.49 S/P LAPAROSCOPIC APPENDECTOMY: Status: RESOLVED | Noted: 2024-08-21 | Resolved: 2025-02-12

## 2025-02-12 PROCEDURE — 99213 OFFICE O/P EST LOW 20 MIN: CPT | Performed by: FAMILY MEDICINE

## 2025-02-12 RX ORDER — AZITHROMYCIN 250 MG/1
TABLET, FILM COATED ORAL
Qty: 6 TABLET | Refills: 0 | Status: SHIPPED | OUTPATIENT
Start: 2025-02-12

## 2025-02-12 NOTE — PROGRESS NOTES
"  Chief Complaint   Patient presents with    URI     Upper Respiratory Infection: Patient complains of symptoms of a URI. Symptoms include congestion and cough. Onset of symptoms was 1 week ago, unchanged since that time. .  He is drinking plenty of fluids. Evaluation to date: none. Treatment to date: cough suppressants.  Ill contacts at home  or work discussed.  Had a URI about a month ago got better  Daughter brought home with influenza type a from school.  Sick again with influenza A last week.  Slowly getting better but now he has a deep anterior midline chest cold, with thick yellow mucus  Vitals:    02/12/25 1414   BP: 130/86   BP Location: Left arm   Patient Position: Sitting   Cuff Size: Large Adult   Pulse: 76   Temp: 98 °F (36.7 °C)   SpO2: 96%   Weight: 117 kg (259 lb)   Height: 182.9 cm (72\")     Gen: mildly ill appearing, alert  Ears: Tm's without redness  Nose:  Congestion  Throat:  without exudate, some drainage, tonsils okay  Neck: no LAD  Lung: mild rales, good air movement, regular RR  Heart: RR without murmur  Skin: no rash.  Assessment & Plan   Diagnoses and all orders for this visit:    1. Acute URI (Primary)    Other orders  -     azithromycin (Zithromax Z-Guero) 250 MG tablet; Take 2 tablets by mouth on day 1, then 1 tablet daily on days 2-5  Dispense: 6 tablet; Refill: 0         There are no Patient Instructions on file for this visit.  Tylenol or Advil as needed for pain, fever, muscle aches  Plenty of fluids  Hand washing discussed  Off work or school note given if needed.  Warm tea for throat.  Pros and cons of antibiotic use discussed    Dr. Rafa Núñez MD  [place], [place]  [facility]  "

## 2025-06-02 DIAGNOSIS — Z00.00 ROUTINE ADULT HEALTH MAINTENANCE: Primary | ICD-10-CM

## 2025-06-02 DIAGNOSIS — Z00.00 ROUTINE ADULT HEALTH MAINTENANCE: ICD-10-CM

## 2025-06-03 LAB
ALBUMIN SERPL-MCNC: 4.3 G/DL (ref 3.5–5.2)
ALBUMIN/GLOB SERPL: 1.6 G/DL
ALP SERPL-CCNC: 91 U/L (ref 39–117)
ALT SERPL-CCNC: 33 U/L (ref 1–41)
APPEARANCE UR: CLEAR
AST SERPL-CCNC: 25 U/L (ref 1–40)
BILIRUB SERPL-MCNC: 0.2 MG/DL (ref 0–1.2)
BILIRUB UR QL STRIP: NEGATIVE
BUN SERPL-MCNC: 21 MG/DL (ref 6–20)
BUN/CREAT SERPL: 14.3 (ref 7–25)
CALCIUM SERPL-MCNC: 9.7 MG/DL (ref 8.6–10.5)
CHLORIDE SERPL-SCNC: 106 MMOL/L (ref 98–107)
CHOLEST SERPL-MCNC: 280 MG/DL (ref 0–200)
CO2 SERPL-SCNC: 24.3 MMOL/L (ref 22–29)
COLOR UR: YELLOW
CREAT SERPL-MCNC: 1.47 MG/DL (ref 0.76–1.27)
EGFRCR SERPLBLD CKD-EPI 2021: 60.3 ML/MIN/1.73
ERYTHROCYTE [DISTWIDTH] IN BLOOD BY AUTOMATED COUNT: 13 % (ref 12.3–15.4)
GLOBULIN SER CALC-MCNC: 2.7 GM/DL
GLUCOSE SERPL-MCNC: 103 MG/DL (ref 65–99)
GLUCOSE UR QL STRIP: NEGATIVE
HCT VFR BLD AUTO: 45.9 % (ref 37.5–51)
HDLC SERPL-MCNC: 47 MG/DL (ref 40–60)
HGB BLD-MCNC: 15.8 G/DL (ref 13–17.7)
HGB UR QL STRIP: NEGATIVE
KETONES UR QL STRIP: NEGATIVE
LDLC SERPL CALC-MCNC: 168 MG/DL (ref 0–100)
LDLC/HDLC SERPL: 3.51 {RATIO}
LEUKOCYTE ESTERASE UR QL STRIP: NEGATIVE
MCH RBC QN AUTO: 30.4 PG (ref 26.6–33)
MCHC RBC AUTO-ENTMCNC: 34.4 G/DL (ref 31.5–35.7)
MCV RBC AUTO: 88.3 FL (ref 79–97)
NITRITE UR QL STRIP: NEGATIVE
PH UR STRIP: 5.5 [PH] (ref 5–8)
PLATELET # BLD AUTO: 241 10*3/MM3 (ref 140–450)
POTASSIUM SERPL-SCNC: 4.8 MMOL/L (ref 3.5–5.2)
PROT SERPL-MCNC: 7 G/DL (ref 6–8.5)
PROT UR QL STRIP: NEGATIVE
RBC # BLD AUTO: 5.2 10*6/MM3 (ref 4.14–5.8)
SODIUM SERPL-SCNC: 141 MMOL/L (ref 136–145)
SP GR UR STRIP: 1.02 (ref 1–1.03)
TRIGL SERPL-MCNC: 339 MG/DL (ref 0–150)
UROBILINOGEN UR STRIP-MCNC: NORMAL MG/DL
VLDLC SERPL CALC-MCNC: 65 MG/DL (ref 5–40)
WBC # BLD AUTO: 6.49 10*3/MM3 (ref 3.4–10.8)

## 2025-06-10 ENCOUNTER — OFFICE VISIT (OUTPATIENT)
Dept: FAMILY MEDICINE CLINIC | Facility: CLINIC | Age: 45
End: 2025-06-10
Payer: COMMERCIAL

## 2025-06-10 VITALS
HEART RATE: 66 BPM | OXYGEN SATURATION: 96 % | HEIGHT: 72 IN | DIASTOLIC BLOOD PRESSURE: 72 MMHG | BODY MASS INDEX: 34.27 KG/M2 | WEIGHT: 253 LBS | TEMPERATURE: 97.8 F | SYSTOLIC BLOOD PRESSURE: 130 MMHG

## 2025-06-10 DIAGNOSIS — E78.2 MODERATE MIXED HYPERLIPIDEMIA NOT REQUIRING STATIN THERAPY: ICD-10-CM

## 2025-06-10 DIAGNOSIS — Z00.00 ROUTINE ADULT HEALTH MAINTENANCE: Primary | ICD-10-CM

## 2025-06-10 PROBLEM — N28.1 BENIGN CYST OF LEFT KIDNEY: Status: RESOLVED | Noted: 2024-10-07 | Resolved: 2025-06-10

## 2025-06-10 PROBLEM — N28.1 BENIGN CYST OF LEFT KIDNEY: Status: ACTIVE | Noted: 2024-10-07

## 2025-06-10 PROCEDURE — 99396 PREV VISIT EST AGE 40-64: CPT | Performed by: FAMILY MEDICINE

## 2025-06-10 NOTE — PROGRESS NOTES
"  Chief Complaint   Patient presents with    Annual Exam       Subjective   Javid Cancino is a 45 y.o. male and is here for a yearly physical exam. The patient reports no problems.    Do you take any herbs or supplements that were not prescribed by a doctor? yes. If so, these will be added to active medication list.    The following portions of the patient's history were reviewed and updated as appropriate: allergies, current medications, past family history, past medical history, past social history, past surgical history, and problem list.    Social and Family and Surgical History reviewed and updated today.    Health and Surgical History, Preventive Measures and Vaccination flow sheets reviewed and updated today.    Patient's current medical chart in Epic; including previous office notes, Mychart messages, recent phone calls, imaging, labs, specialist's evaluation either in notes or in Media tab reviewed today.    Other outside pertinent medical information also reviewed thru Care Everywhere function is also reviewed today.    Review of Systems  Review of Systems  Pertinent items are noted in HPI.    Vitals:    06/10/25 0948   BP: 130/72   BP Location: Left arm   Patient Position: Sitting   Cuff Size: Adult   Pulse: 66   Temp: 97.8 °F (36.6 °C)   SpO2: 96%   Weight: 115 kg (253 lb)   Height: 182.9 cm (72\")     Body mass index is 34.31 kg/m².          General Appearance:  Alert, cooperative, no distress, appears stated age   Head:  Normocephalic, without obvious abnormality, atraumatic   Eyes:  PERRL, conjunctiva/corneas clear, EOM's intact.   Ears:  Normal TM's and external ear canals, both ears   Nose: Nares normal, septum midline, mucosa normal, no drainage or sinus tenderness   Throat: Lips, mucosa, and tongue normal; teeth and gums viewed   Neck: Supple, symmetrical,  no adenopathy;   thyroid: No enlargement/tenderness/nodules;   no carotid bruit   Back:  Symmetric, no curvature, ROM normal, no CVA " tenderness   Lungs:  Clear to auscultation bilaterally, respirations unlabored   Chest wall:  No tenderness or deformity   Heart:  Regular rate and rhythm, S1 and S2 normal, no murmur.   Abdomen:  Soft, non-tender, bowel sounds active all four quadrants,   no masses, no organomegaly   Rectal:        Extremities: Extremities normal, atraumatic, no cyanosis or edema   Pulses: 2+ and symmetric all extremities   Skin: Skin color, texture, turgor normal, no rashes. No suspicious lesions   Lymph nodes: Cervical, supraclavicular, and axillary nodes normal   Neurologic: CNII-XII intact. Normal strength, sensation.          Results for orders placed or performed in visit on 06/02/25   Urinalysis With Microscopic If Indicated (No Culture) - Urine, Clean Catch    Collection Time: 06/03/25 10:00 AM    Specimen: Urine, Clean Catch   Result Value Ref Range    Specific Gravity, UA 1.022 1.005 - 1.030    pH, UA 5.5 5.0 - 8.0    Color, UA Yellow     Appearance, UA Clear Clear    Leukocytes, UA Negative Negative    Protein Negative Negative    Glucose, UA Negative Negative    Ketones Negative Negative    Blood, UA Negative Negative    Bilirubin, UA Negative Negative    Urobilinogen, UA Comment     Nitrite, UA Negative Negative   Lipid Panel With LDL/HDL Ratio    Collection Time: 06/03/25 10:00 AM    Specimen: Blood   Result Value Ref Range    Total Cholesterol 280 (H) 0 - 200 mg/dL    Triglycerides 339 (H) 0 - 150 mg/dL    HDL Cholesterol 47 40 - 60 mg/dL    VLDL Cholesterol Alan 65 (H) 5 - 40 mg/dL    LDL Chol Calc (NIH) 168 (H) 0 - 100 mg/dL    LDL/HDL RATIO 3.51    Comprehensive metabolic panel    Collection Time: 06/03/25 10:00 AM    Specimen: Blood   Result Value Ref Range    Glucose 103 (H) 65 - 99 mg/dL    BUN 21.0 (H) 6.0 - 20.0 mg/dL    Creatinine 1.47 (H) 0.76 - 1.27 mg/dL    EGFR Result 60.3 >60.0 mL/min/1.73    BUN/Creatinine Ratio 14.3 7.0 - 25.0    Sodium 141 136 - 145 mmol/L    Potassium 4.8 3.5 - 5.2 mmol/L    Chloride  106 98 - 107 mmol/L    Total CO2 24.3 22.0 - 29.0 mmol/L    Calcium 9.7 8.6 - 10.5 mg/dL    Total Protein 7.0 6.0 - 8.5 g/dL    Albumin 4.3 3.5 - 5.2 g/dL    Globulin 2.7 gm/dL    A/G Ratio 1.6 g/dL    Total Bilirubin 0.2 0.0 - 1.2 mg/dL    Alkaline Phosphatase 91 39 - 117 U/L    AST (SGOT) 25 1 - 40 U/L    ALT (SGPT) 33 1 - 41 U/L   CBC (No Diff)    Collection Time: 06/03/25 10:00 AM    Specimen: Blood   Result Value Ref Range    WBC 6.49 3.40 - 10.80 10*3/mm3    RBC 5.20 4.14 - 5.80 10*6/mm3    Hemoglobin 15.8 13.0 - 17.7 g/dL    Hematocrit 45.9 37.5 - 51.0 %    MCV 88.3 79.0 - 97.0 fL    MCH 30.4 26.6 - 33.0 pg    MCHC 34.4 31.5 - 35.7 g/dL    RDW 13.0 12.3 - 15.4 %    Platelets 241 140 - 450 10*3/mm3     Assessment & Plan   Healthy male exam.  Diagnoses and all orders for this visit:    1. Routine adult health maintenance (Primary)  -     CBC (No Diff); Future  -     Comprehensive Metabolic Panel; Future  -     Lipid Panel; Future  -     Urinalysis With Microscopic If Indicated (No Culture) - Urine, Clean Catch; Future    2. Moderate mixed hyperlipidemia not requiring statin therapy  -     Lipid Panel; Future      1. .  Last visit here he had a sinus infection which was treated with a Z-Guero  Reports he had left knee surgery he had a meniscus injury while he was skiing in [place] on spring break  Surgery went well, he is finishing up physical therapy    Labs discussed  Cholesterol still elevated.  Positive family history of hyperlipidemia including mom dad and brother  He wants to hold off on statin medication at this time    Creatinine and BUN levels are up.  He was on a complete fast before labs.  Likely prerenal dehydration  Discussed to drink water before fasting labs next time  2. Patient Counseling:  --Nutrition: Stressed importance of moderation in: sodium, caffeine, , saturated fat and cholesterol.  Discussed: caloric balance, sufficient intake of fresh fruits, vegetables, fiber, calcium,  iron.  --Exercise: Stressed the importance of regular exercise.   --Substance Abuse: Discussed cessation and or reduction of tobacco, alcohol, or other drug use; driving or other dangerous activities under the influence.    --Dental health: Discussed importance of regular tooth brushing, flossing, and dental visits.  --Suggested having eyes and vision checked if needed or past due.  --Immunizations reviewed and given if needed.  --Discussed benefits of screening colonoscopy and or ColoGuard.  3. Discussed the patient's BMI with him.  The BMI is in the acceptable range  4. Follow up in one year    There are no Patient Instructions on file for this visit.    Medications Discontinued During This Encounter   Medication Reason    azithromycin (Zithromax Z-Guero) 250 MG tablet *Therapy completed        Dr. Rafa Núñez MD  [place]  [facility]